# Patient Record
Sex: MALE | Race: WHITE | URBAN - METROPOLITAN AREA
[De-identification: names, ages, dates, MRNs, and addresses within clinical notes are randomized per-mention and may not be internally consistent; named-entity substitution may affect disease eponyms.]

---

## 2020-05-09 ENCOUNTER — OFFICE VISIT (OUTPATIENT)
Dept: URGENT CARE | Age: 25
End: 2020-05-09
Payer: COMMERCIAL

## 2020-05-09 VITALS — TEMPERATURE: 100.6 F | OXYGEN SATURATION: 96 % | RESPIRATION RATE: 16 BRPM | HEART RATE: 118 BPM

## 2020-05-09 DIAGNOSIS — R50.9 FEVER, UNSPECIFIED FEVER CAUSE: Primary | ICD-10-CM

## 2020-05-09 PROCEDURE — U0003 INFECTIOUS AGENT DETECTION BY NUCLEIC ACID (DNA OR RNA); SEVERE ACUTE RESPIRATORY SYNDROME CORONAVIRUS 2 (SARS-COV-2) (CORONAVIRUS DISEASE [COVID-19]), AMPLIFIED PROBE TECHNIQUE, MAKING USE OF HIGH THROUGHPUT TECHNOLOGIES AS DESCRIBED BY CMS-2020-01-R: HCPCS | Performed by: PREVENTIVE MEDICINE

## 2020-05-09 PROCEDURE — S9083 URGENT CARE CENTER GLOBAL: HCPCS | Performed by: PREVENTIVE MEDICINE

## 2020-05-09 PROCEDURE — G0382 LEV 3 HOSP TYPE B ED VISIT: HCPCS | Performed by: PREVENTIVE MEDICINE

## 2020-05-10 ENCOUNTER — TELEPHONE (OUTPATIENT)
Dept: URGENT CARE | Age: 25
End: 2020-05-10

## 2020-05-10 LAB — SARS-COV-2 RNA SPEC QL NAA+PROBE: NOT DETECTED

## 2024-12-08 ENCOUNTER — OFFICE VISIT (OUTPATIENT)
Dept: URGENT CARE | Facility: MEDICAL CENTER | Age: 29
End: 2024-12-08
Payer: COMMERCIAL

## 2024-12-08 ENCOUNTER — APPOINTMENT (OUTPATIENT)
Dept: URGENT CARE | Facility: MEDICAL CENTER | Age: 29
End: 2024-12-08
Payer: COMMERCIAL

## 2024-12-08 VITALS
HEART RATE: 102 BPM | TEMPERATURE: 98.1 F | OXYGEN SATURATION: 98 % | SYSTOLIC BLOOD PRESSURE: 136 MMHG | DIASTOLIC BLOOD PRESSURE: 73 MMHG | RESPIRATION RATE: 18 BRPM

## 2024-12-08 DIAGNOSIS — J03.90 ACUTE TONSILLITIS, UNSPECIFIED ETIOLOGY: Primary | ICD-10-CM

## 2024-12-08 DIAGNOSIS — R68.89 FLU-LIKE SYMPTOMS: ICD-10-CM

## 2024-12-08 LAB — S PYO AG THROAT QL: NEGATIVE

## 2024-12-08 PROCEDURE — G0382 LEV 3 HOSP TYPE B ED VISIT: HCPCS

## 2024-12-08 PROCEDURE — 87147 CULTURE TYPE IMMUNOLOGIC: CPT

## 2024-12-08 PROCEDURE — 87636 SARSCOV2 & INF A&B AMP PRB: CPT

## 2024-12-08 PROCEDURE — 87070 CULTURE OTHR SPECIMN AEROBIC: CPT

## 2024-12-08 PROCEDURE — S9083 URGENT CARE CENTER GLOBAL: HCPCS

## 2024-12-08 RX ORDER — METHYLPREDNISOLONE 4 MG/1
TABLET ORAL
Qty: 1 EACH | Refills: 0 | Status: SHIPPED | OUTPATIENT
Start: 2024-12-08

## 2024-12-08 NOTE — PROGRESS NOTES
[facility]        NAME: Tyrell Krueger is a 29 y.o. male  : 1995    MRN: [id number]  DATE: 2024  TIME: 5:46 PM    Assessment and Plan   Acute tonsillitis, unspecified etiology [J03.90]  1. Acute tonsillitis, unspecified etiology  POCT rapid ANTIGEN strepA    methylPREDNISolone 4 MG tablet therapy pack    Throat culture    Throat culture      2. Flu-like symptoms  Covid/Flu- Office Collect Normal    Covid/Flu- Office Collect Normal        POCT rapid strepA: Negative    Will send out for culture. If positive will treat with antibiotics.      PCR for COVID/Flu collected in clinic, will call patient if results are positive and pt did not view results on [facility] MyChart.     Patient Instructions   Take steroids as prescribed.   Recommend to take them in the morning and with food  Do not take Ibuprofen while on steroids.   May take Acetaminophen for pain.  Discussed that steroids may elevated blood glucose levels and that pt should monitor blood sugars closely.     Tyrell presented with 3 day history of flu like symptoms.    Recommend taking a daily multivitamin and ELDERBERRY to help bolster the immune system.     Get at least 30 minutes of sunshine and fresh air per day to help boost vitamin D.      Alternate tylenol with ibuprofen every 3 hours to help manage fever and/or discomfort PRN.     May take dayquil and nyquil AS NEEDED, no closer than every 4 hours if containing tylenol (acetaminophen).     Motrin may be more beneficial for body aches, headache and can be taken every 6 hours as needed.      You may use throat lozenges, salt julio gargles, warm liquids (tea, hot chocolate, soup) to help with throat discomfort.  Encourage coughing into the elbow instead of the hand.   Washing hands frequently with warm water and soap may help stop spread of infection.  Encourage good hydration and nutrition. Offer fluids frequently and supplement with pedialyte if necessary.    Follow up  with PCP in 3-5 days.  Proceed to ER if symptoms worsen.    If tests are performed, our office will contact you with results only if changes need to made to the care plan discussed with you at the visit. You can review your full results on [facility].    Chief Complaint     Chief Complaint   Patient presents with    Sore Throat     Sorethroat fever for 3 days      History of Present Illness       Sore Throat   This is a new problem. The current episode started in the past 7 days (x3 days). The maximum temperature recorded prior to his arrival was 102 - 102.9 F. Associated symptoms include congestion, coughing, diarrhea and headaches. Pertinent negatives include no ear pain, shortness of breath or vomiting. He has had no exposure to strep.       Review of Systems   Review of Systems   Constitutional:  Positive for appetite change, chills, diaphoresis, fatigue and fever (Tmax 102.7).   HENT:  Positive for congestion, postnasal drip and sore throat. Negative for ear pain, rhinorrhea, sinus pressure and sinus pain.    Respiratory:  Positive for cough. Negative for shortness of breath and wheezing.    Cardiovascular: Negative.  Negative for chest pain and palpitations.   Gastrointestinal:  Positive for diarrhea. Negative for nausea and vomiting.   Musculoskeletal:  Positive for myalgias.   Skin:  Negative for color change, rash and wound.   Neurological:  Positive for headaches.     Current Medications       Current Outpatient Medications:     methylPREDNISolone 4 MG tablet therapy pack, Use as directed on package, Disp: 1 each, Rfl: 0    Current Allergies     Allergies as of 12/08/2024    (No Known Allergies)            The following portions of the patient's history were reviewed and updated as appropriate: allergies, current medications, past family history, past medical history, past social history, past surgical history and problem list.     History reviewed. No pertinent past medical history.    History  reviewed. No pertinent surgical history.    History reviewed. No pertinent family history.      Medications have been verified.        Objective   /73   Pulse 102   Temp 98.1 °F (36.7 °C)   Resp 18   SpO2 98%        Physical Exam     Physical Exam  Vitals and nursing note reviewed.   Constitutional:       General: He is not in acute distress.     Appearance: Normal appearance. He is not ill-appearing, toxic-appearing or diaphoretic.   HENT:      Head: Normocephalic.      Right Ear: Tympanic membrane, ear canal and external ear normal. There is no impacted cerumen.      Left Ear: Tympanic membrane, ear canal and external ear normal. There is no impacted cerumen.      Nose: Nose normal. No congestion or rhinorrhea.      Mouth/Throat:      Mouth: Mucous membranes are moist.      Pharynx: Uvula midline. Pharyngeal swelling, posterior oropharyngeal erythema, uvula swelling and postnasal drip present. No oropharyngeal exudate.      Tonsils: 2+ on the right. 2+ on the left.   Cardiovascular:      Rate and Rhythm: Regular rhythm. Tachycardia present.      Pulses: Normal pulses.      Heart sounds: Normal heart sounds. No murmur heard.  Pulmonary:      Effort: Pulmonary effort is normal. No respiratory distress.      Breath sounds: Normal breath sounds. No stridor. No wheezing, rhonchi or rales.   Chest:      Chest wall: No tenderness.   Musculoskeletal:         General: Normal range of motion.   Lymphadenopathy:      Head:      Right side of head: Tonsillar adenopathy present.      Left side of head: Tonsillar adenopathy present.      Cervical: Cervical adenopathy present.      Right cervical: Superficial cervical adenopathy present.      Left cervical: Superficial cervical adenopathy present.   Skin:     General: Skin is warm.   Neurological:      Mental Status: He is alert.

## 2024-12-08 NOTE — PATIENT INSTRUCTIONS
Take steroids as prescribed.   Recommend to take them in the morning and with food  Do not take Ibuprofen while on steroids.   May take Acetaminophen for pain.  Discussed that steroids may elevated blood glucose levels and that pt should monitor blood sugars closely.     Tyrell presented with 3 day history of flu like symptoms.    Recommend taking a daily multivitamin and ELDERBERRY to help bolster the immune system.     Get at least 30 minutes of sunshine and fresh air per day to help boost vitamin D.      Alternate tylenol with ibuprofen every 3 hours to help manage fever and/or discomfort PRN.     May take dayquil and nyquil AS NEEDED, no closer than every 4 hours if containing tylenol (acetaminophen).     Motrin may be more beneficial for body aches, headache and can be taken every 6 hours as needed.      You may use throat lozenges, salt julio gargles, warm liquids (tea, hot chocolate, soup) to help with throat discomfort.  Encourage coughing into the elbow instead of the hand.   Washing hands frequently with warm water and soap may help stop spread of infection.  Encourage good hydration and nutrition. Offer fluids frequently and supplement with pedialyte if necessary.    Follow up with PCP in 3-5 days.  Proceed to ER if symptoms worsen.    If tests are performed, our office will contact you with results only if changes need to made to the care plan discussed with you at the visit. You can review your full results on [facility].

## 2024-12-09 LAB
FLUAV RNA RESP QL NAA+PROBE: NEGATIVE
FLUBV RNA RESP QL NAA+PROBE: NEGATIVE
SARS-COV-2 RNA RESP QL NAA+PROBE: NEGATIVE

## 2024-12-11 ENCOUNTER — RESULTS FOLLOW-UP (OUTPATIENT)
Dept: URGENT CARE | Facility: MEDICAL CENTER | Age: 29
End: 2024-12-11

## 2024-12-11 LAB — BACTERIA THROAT CULT: ABNORMAL
